# Patient Record
Sex: MALE | Race: WHITE | NOT HISPANIC OR LATINO | Employment: OTHER
[De-identification: names, ages, dates, MRNs, and addresses within clinical notes are randomized per-mention and may not be internally consistent; named-entity substitution may affect disease eponyms.]

---

## 2020-03-22 ENCOUNTER — HEALTH MAINTENANCE LETTER (OUTPATIENT)
Age: 75
End: 2020-03-22

## 2021-01-15 ENCOUNTER — HEALTH MAINTENANCE LETTER (OUTPATIENT)
Age: 76
End: 2021-01-15

## 2021-05-09 ENCOUNTER — HEALTH MAINTENANCE LETTER (OUTPATIENT)
Age: 76
End: 2021-05-09

## 2021-10-24 ENCOUNTER — HEALTH MAINTENANCE LETTER (OUTPATIENT)
Age: 76
End: 2021-10-24

## 2022-06-05 ENCOUNTER — HEALTH MAINTENANCE LETTER (OUTPATIENT)
Age: 77
End: 2022-06-05

## 2022-09-26 PROCEDURE — 88304 TISSUE EXAM BY PATHOLOGIST: CPT | Mod: TC,ORL | Performed by: OPHTHALMOLOGY

## 2022-09-27 ENCOUNTER — LAB REQUISITION (OUTPATIENT)
Dept: LAB | Facility: CLINIC | Age: 77
End: 2022-09-27
Payer: MEDICARE

## 2022-09-27 DIAGNOSIS — C69.01 MALIGNANT NEOPLASM OF RIGHT CONJUNCTIVA (H): ICD-10-CM

## 2022-09-28 LAB
PATH REPORT.COMMENTS IMP SPEC: NORMAL
PATH REPORT.FINAL DX SPEC: NORMAL
PATH REPORT.GROSS SPEC: NORMAL
PATH REPORT.MICROSCOPIC SPEC OTHER STN: NORMAL
PATH REPORT.MICROSCOPIC SPEC OTHER STN: NORMAL
PATH REPORT.RELEVANT HX SPEC: NORMAL
PHOTO IMAGE: NORMAL

## 2022-09-28 PROCEDURE — 88304 TISSUE EXAM BY PATHOLOGIST: CPT | Mod: 26 | Performed by: PATHOLOGY

## 2022-10-16 ENCOUNTER — HEALTH MAINTENANCE LETTER (OUTPATIENT)
Age: 77
End: 2022-10-16

## 2023-04-15 ENCOUNTER — HOSPITAL ENCOUNTER (EMERGENCY)
Facility: CLINIC | Age: 78
Discharge: HOME | End: 2023-04-15
Attending: EMERGENCY MEDICINE | Admitting: EMERGENCY MEDICINE
Payer: MEDICARE

## 2023-04-15 VITALS
RESPIRATION RATE: 22 BRPM | DIASTOLIC BLOOD PRESSURE: 94 MMHG | HEIGHT: 69 IN | BODY MASS INDEX: 28.88 KG/M2 | SYSTOLIC BLOOD PRESSURE: 155 MMHG | OXYGEN SATURATION: 98 % | HEART RATE: 117 BPM | TEMPERATURE: 97 F | WEIGHT: 195 LBS

## 2023-04-15 DIAGNOSIS — R33.9 URINARY RETENTION: ICD-10-CM

## 2023-04-15 LAB
ALBUMIN UR-MCNC: NEGATIVE MG/DL
AMORPH CRY #/AREA URNS HPF: ABNORMAL /HPF
APPEARANCE UR: CLEAR
BILIRUB UR QL STRIP: NEGATIVE
COLOR UR AUTO: ABNORMAL
GLUCOSE UR STRIP-MCNC: NEGATIVE MG/DL
HGB UR QL STRIP: NEGATIVE
KETONES UR STRIP-MCNC: NEGATIVE MG/DL
LEUKOCYTE ESTERASE UR QL STRIP: NEGATIVE
NITRATE UR QL: NEGATIVE
PH UR STRIP: 7 [PH] (ref 5–7)
RBC URINE: 1 /HPF
SP GR UR STRIP: 1.01 (ref 1–1.03)
UROBILINOGEN UR STRIP-MCNC: NORMAL MG/DL
WBC URINE: 1 /HPF

## 2023-04-15 PROCEDURE — 51702 INSERT TEMP BLADDER CATH: CPT

## 2023-04-15 PROCEDURE — 51798 US URINE CAPACITY MEASURE: CPT

## 2023-04-15 PROCEDURE — 81001 URINALYSIS AUTO W/SCOPE: CPT | Performed by: EMERGENCY MEDICINE

## 2023-04-15 PROCEDURE — 99284 EMERGENCY DEPT VISIT MOD MDM: CPT | Mod: 25

## 2023-04-15 RX ORDER — TAMSULOSIN HYDROCHLORIDE 0.4 MG/1
0.4 CAPSULE ORAL DAILY
Qty: 7 CAPSULE | Refills: 0 | Status: SHIPPED | OUTPATIENT
Start: 2023-04-15 | End: 2023-04-22

## 2023-04-15 ASSESSMENT — ACTIVITIES OF DAILY LIVING (ADL): ADLS_ACUITY_SCORE: 35

## 2023-04-16 ENCOUNTER — HOSPITAL ENCOUNTER (EMERGENCY)
Facility: CLINIC | Age: 78
Discharge: HOME | End: 2023-04-16
Attending: EMERGENCY MEDICINE | Admitting: EMERGENCY MEDICINE
Payer: MEDICARE

## 2023-04-16 ENCOUNTER — APPOINTMENT (OUTPATIENT)
Dept: CT IMAGING | Facility: CLINIC | Age: 78
End: 2023-04-16
Attending: EMERGENCY MEDICINE
Payer: MEDICARE

## 2023-04-16 VITALS
TEMPERATURE: 97.7 F | SYSTOLIC BLOOD PRESSURE: 126 MMHG | BODY MASS INDEX: 28.8 KG/M2 | DIASTOLIC BLOOD PRESSURE: 70 MMHG | RESPIRATION RATE: 20 BRPM | WEIGHT: 195 LBS | OXYGEN SATURATION: 97 % | HEART RATE: 68 BPM

## 2023-04-16 DIAGNOSIS — R31.9 URINARY TRACT INFECTION WITH HEMATURIA, SITE UNSPECIFIED: ICD-10-CM

## 2023-04-16 DIAGNOSIS — E86.0 DEHYDRATION: ICD-10-CM

## 2023-04-16 DIAGNOSIS — N48.89 PENILE PAIN: ICD-10-CM

## 2023-04-16 DIAGNOSIS — N39.0 URINARY TRACT INFECTION WITH HEMATURIA, SITE UNSPECIFIED: ICD-10-CM

## 2023-04-16 LAB
ALBUMIN UR-MCNC: 200 MG/DL
ALBUMIN UR-MCNC: ABNORMAL G/DL
ANION GAP SERPL CALCULATED.3IONS-SCNC: 12 MMOL/L (ref 7–15)
APPEARANCE UR: ABNORMAL
APPEARANCE UR: ABNORMAL
BACTERIA #/AREA URNS HPF: ABNORMAL /[HPF]
BASOPHILS # BLD AUTO: 0.1 10E3/UL (ref 0–0.2)
BASOPHILS NFR BLD AUTO: 1 %
BILIRUB UR QL STRIP: ABNORMAL
BILIRUB UR QL STRIP: NEGATIVE
BUN SERPL-MCNC: 13.2 MG/DL (ref 8–23)
CALCIUM SERPL-MCNC: 10.1 MG/DL (ref 8.8–10.2)
CHLORIDE SERPL-SCNC: 107 MMOL/L (ref 98–107)
COLOR UR AUTO: ABNORMAL
COLOR UR AUTO: ABNORMAL
CREAT SERPL-MCNC: 0.79 MG/DL (ref 0.67–1.17)
DEPRECATED HCO3 PLAS-SCNC: 21 MMOL/L (ref 22–29)
EOSINOPHIL # BLD AUTO: 0.2 10E3/UL (ref 0–0.7)
EOSINOPHIL NFR BLD AUTO: 2 %
ERYTHROCYTE [DISTWIDTH] IN BLOOD BY AUTOMATED COUNT: 13 % (ref 10–15)
GFR SERPL CREATININE-BSD FRML MDRD: >90 ML/MIN/1.73M2
GLUCOSE SERPL-MCNC: 132 MG/DL (ref 70–99)
GLUCOSE UR STRIP-MCNC: ABNORMAL MG/DL
GLUCOSE UR STRIP-MCNC: NEGATIVE MG/DL
HCT VFR BLD AUTO: 40.8 % (ref 40–53)
HGB BLD-MCNC: 13.7 G/DL (ref 13.3–17.7)
HGB UR QL STRIP: ABNORMAL
HGB UR QL STRIP: ABNORMAL
IMM GRANULOCYTES # BLD: 0 10E3/UL
IMM GRANULOCYTES NFR BLD: 0 %
KETONES UR STRIP-MCNC: ABNORMAL MG/DL
KETONES UR STRIP-MCNC: NEGATIVE MG/DL
LEUKOCYTE ESTERASE UR QL STRIP: ABNORMAL
LEUKOCYTE ESTERASE UR QL STRIP: ABNORMAL
LYMPHOCYTES # BLD AUTO: 1.2 10E3/UL (ref 0.8–5.3)
LYMPHOCYTES NFR BLD AUTO: 11 %
MCH RBC QN AUTO: 32.5 PG (ref 26.5–33)
MCHC RBC AUTO-ENTMCNC: 33.6 G/DL (ref 31.5–36.5)
MCV RBC AUTO: 97 FL (ref 78–100)
MONOCYTES # BLD AUTO: 0.7 10E3/UL (ref 0–1.3)
MONOCYTES NFR BLD AUTO: 7 %
MUCOUS THREADS #/AREA URNS LPF: ABNORMAL /[LPF]
MUCOUS THREADS #/AREA URNS LPF: PRESENT /LPF
NEUTROPHILS # BLD AUTO: 8.4 10E3/UL (ref 1.6–8.3)
NEUTROPHILS NFR BLD AUTO: 79 %
NITRATE UR QL: ABNORMAL
NITRATE UR QL: NEGATIVE
NRBC # BLD AUTO: 0 10E3/UL
NRBC BLD AUTO-RTO: 0 /100
PH UR STRIP: 5.5 [PH] (ref 5–7)
PH UR STRIP: ABNORMAL [PH]
PLATELET # BLD AUTO: 272 10E3/UL (ref 150–450)
POTASSIUM SERPL-SCNC: 3.6 MMOL/L (ref 3.4–5.3)
RBC # BLD AUTO: 4.22 10E6/UL (ref 4.4–5.9)
RBC URINE: >182 /HPF
RBC URINE: ABNORMAL
SODIUM SERPL-SCNC: 140 MMOL/L (ref 136–145)
SP GR UR STRIP: 1.03 (ref 1–1.03)
SP GR UR STRIP: ABNORMAL
UROBILINOGEN UR STRIP-MCNC: ABNORMAL MG/DL
UROBILINOGEN UR STRIP-MCNC: NORMAL MG/DL
WBC # BLD AUTO: 10.7 10E3/UL (ref 4–11)
WBC URINE: 100 /HPF
WBC URINE: ABNORMAL

## 2023-04-16 PROCEDURE — G1010 CDSM STANSON: HCPCS

## 2023-04-16 PROCEDURE — 36415 COLL VENOUS BLD VENIPUNCTURE: CPT | Performed by: EMERGENCY MEDICINE

## 2023-04-16 PROCEDURE — 96374 THER/PROPH/DIAG INJ IV PUSH: CPT

## 2023-04-16 PROCEDURE — 81003 URINALYSIS AUTO W/O SCOPE: CPT | Performed by: EMERGENCY MEDICINE

## 2023-04-16 PROCEDURE — 99285 EMERGENCY DEPT VISIT HI MDM: CPT | Mod: 25

## 2023-04-16 PROCEDURE — 80048 BASIC METABOLIC PNL TOTAL CA: CPT | Performed by: EMERGENCY MEDICINE

## 2023-04-16 PROCEDURE — 250N000011 HC RX IP 250 OP 636: Performed by: EMERGENCY MEDICINE

## 2023-04-16 PROCEDURE — 85025 COMPLETE CBC W/AUTO DIFF WBC: CPT | Performed by: EMERGENCY MEDICINE

## 2023-04-16 PROCEDURE — 258N000003 HC RX IP 258 OP 636: Performed by: EMERGENCY MEDICINE

## 2023-04-16 PROCEDURE — 87086 URINE CULTURE/COLONY COUNT: CPT | Performed by: EMERGENCY MEDICINE

## 2023-04-16 PROCEDURE — 96361 HYDRATE IV INFUSION ADD-ON: CPT

## 2023-04-16 RX ORDER — DIAZEPAM 5 MG
5 TABLET ORAL EVERY 6 HOURS PRN
Qty: 10 TABLET | Refills: 0 | Status: SHIPPED | OUTPATIENT
Start: 2023-04-16 | End: 2023-04-21

## 2023-04-16 RX ORDER — CEPHALEXIN 500 MG/1
500 CAPSULE ORAL 3 TIMES DAILY
Qty: 21 CAPSULE | Refills: 0 | Status: SHIPPED | OUTPATIENT
Start: 2023-04-16 | End: 2023-04-23

## 2023-04-16 RX ORDER — DIAZEPAM 10 MG/2ML
5 INJECTION, SOLUTION INTRAMUSCULAR; INTRAVENOUS ONCE
Status: COMPLETED | OUTPATIENT
Start: 2023-04-16 | End: 2023-04-16

## 2023-04-16 RX ADMIN — DIAZEPAM 5 MG: 5 INJECTION INTRAMUSCULAR; INTRAVENOUS at 13:48

## 2023-04-16 RX ADMIN — SODIUM CHLORIDE 1000 ML: 9 INJECTION, SOLUTION INTRAVENOUS at 16:13

## 2023-04-16 ASSESSMENT — ENCOUNTER SYMPTOMS
ABDOMINAL PAIN: 0
HEMATURIA: 1
DIFFICULTY URINATING: 1
FLANK PAIN: 0

## 2023-04-16 ASSESSMENT — ACTIVITIES OF DAILY LIVING (ADL)
ADLS_ACUITY_SCORE: 35
ADLS_ACUITY_SCORE: 35

## 2023-04-16 NOTE — ED TRIAGE NOTES
Pt here yesterday, had aponte cath placed, has been having come bloody urine, cherry colored, having severe pain in penis, pt states urine has been draining    Triage Assessment     Row Name 04/16/23 1236       Triage Assessment (Adult)    Airway WDL WDL       Respiratory WDL    Respiratory WDL WDL       Cardiac WDL    Cardiac WDL WDL       Cognitive/Neuro/Behavioral WDL    Cognitive/Neuro/Behavioral WDL WDL

## 2023-04-16 NOTE — DISCHARGE INSTRUCTIONS
As we discussed, no obvious cause of your pain was found here today, and the CT scan does show incidental kidney stones in your left side, that are not likely to be symptomatic.  He also did have some blood in your urine, this is potentially related to the catheter insertion from yesterday, which certainly would have caused some bleeding as it rubbed up against your enlarged prostate.  Regarding your pain, not entirely sure what is coming from, it may be a combination of the discomfort you are feeling from the Gruber catheter itself, and possibly even spasms given the on and off nature of your symptoms.  You also may be having some pain from a possible infection and since your urine does have changes since yesterday, we have decided to treat for a urine infection as well.  Please be certain that you see urology in the next 2 to 3 days as well in person.  Come back with any other concerns or questions or if you feel like you have any allergic reaction to the medications we have given you here.

## 2023-04-16 NOTE — ED PROVIDER NOTES
History     Chief Complaint:  Hematuria     HPI   Chaz Dye is a 77 year old male with a history of diffuse large B-cell lymphoma and BPH who presents with hematuria and severe penile pain ongoing since onset earlier this morning. The patient has enlarged prostate, which hadn't caused any issues until yesterday when he was newly unable to urinate. He was seen here in ED at which time a catheter was placed and the patient was discharged home with Flomax (took yesterday and today). Last night the patient was able to get some sleep until he awoke at 0700 this morning with severe penile pain and new hematuria. Since then, his pain has persisted in intermittent boughts, prompting his concern and representation to ED at this time. Presently, patient notes that he feels as if he can't urinate and states that when he presses on his abdomen, his bladder feels full. He denies any abdominal or flank pain. No history of hematuria.    Independent Historian:   None - Patient Only    Review of External Notes: Yes, I have reviewed past visits for lymphoma     ROS:  Review of Systems   Gastrointestinal: Negative for abdominal pain.   Genitourinary: Positive for difficulty urinating, hematuria and penile pain. Negative for flank pain.   All other systems reviewed and are negative.     Allergies:  Bactrim [Sulfamethoxazole W/Trimethoprim]  Cefepime  Ciprofloxacin  Imipenem  Cisplatin  Morphine  Vancomycin     Medications:    Aspirin 325 mg  Carvedilol  Cetirizine  Fluticasone  Latanoprost  Pantoprazole sodium  Rosuvastatin calcium  Tamsulosin  Valsartan  Cholecalciferol  Clobetasol  Chlorthalidone  Esomeprazole magnesium  Dimethicone    Past Medical History:    Diffuse large B-cell lymphoma  Hypertension  Hyperlipidemia  Hyperparathyroidism  Insomnia  Melanoma  Multiple sclerosis  Malignant melanoma of skin  Seborrheic keratosis  Nevus pigmentation  Osteopenia  Hypertriglyceridemia  Erectile dysfunction  Reflux  esophagitis  Schatzki's ring  Colonic polyps  Thrombocytopenia  Non-Hodgkin's lymphoma  Agranulocytosis  Syncope  Obesity  Tinnitus  Burkitt's tumor  GERD  Parathyroid adenoma  BPH  Hiatal hernia  Neutropenia  Prediabetes  Reactive depression  Acute renal failure  Stem cell transplant  Vitamin D deficiency    Past Surgical History:    Appendectomy  Colonoscopy  Hernia repair  Rotator cuff repair  Meniscectomy  Parathyroidectomy  Phacoemulsification bilateral  Right wrist cyst removal  Surgery of the small intestine  EGD, combined  Shoulder surgery, right, x2    Family History:   COPD  CHF  Alcoholism    Social History:  The patient presented alone.  The patient arrived in a private vehicle.  PCP: Missael Keith     Physical Exam     Patient Vitals for the past 24 hrs:   BP Temp Pulse Resp SpO2 Weight   04/16/23 1238 126/70 -- -- -- -- --   04/16/23 1235 -- 97.7  F (36.5  C) 68 20 97 % 88.5 kg (195 lb)      Physical Exam  Vitals: reviewed by me  General: Pt seen on [facility], pleasant, cooperative, and alert to conversation.  Very anxious however also appears to be in significant pain, jumping up and down off the bed occasionally with pain  Eyes: Tracking well, clear conjunctiva BL  ENT: MMM, midline trachea.   Lungs: No tachypnea, no accessory muscle use. No respiratory distress.   CV: Rate as above  Abd: Soft, non tender, no guarding, no rebound. Non distended  Gruber catheter appears to be in place, intermittent but not repeatable tenderness noted to the inferior aspect of his penis along the track of the Gruber catheter.  Urethra with no inflammation or drainage, no evidence of trauma, I do not feel the balloon in the urethra.  MSK: no joint effusion.  No evidence of trauma  Skin: No rash  Neuro: Clear speech and no facial droop.  Psych: Not RIS, no e/o AH/VH      Emergency Department Course     Imaging:  CT Abdomen Pelvis w/o Contrast   Final Result   IMPRESSION:    1.  Prominent prostatic enlargement.   2.   Cholelithiasis.   3.  There are two nonobstructing stones in the lower pole of the right kidney, with the largest measuring 0.6 cm.   4.  Indeterminate cystic lesion in the pancreatic tail measures 1.6 cm. A follow-up pancreatic MRI with MRCP in one year would be recommended.            Report per radiology    Laboratory:  Labs Ordered and Resulted from Time of ED Arrival to Time of ED Departure   ROUTINE UA WITH MICROSCOPIC REFLEX TO CULTURE - Abnormal       Result Value    Color Urine Brown (*)     Appearance Urine Slightly Cloudy (*)     Glucose Urine        Bilirubin Urine        Ketones Urine        Specific Gravity Urine        Blood Urine        pH Urine        Protein Albumin Urine        Urobilinogen Urine        Nitrite Urine        Leukocyte Esterase Urine        Bacteria Urine        Mucus Urine        RBC Urine        WBC Urine       BASIC METABOLIC PANEL - Abnormal    Sodium 140      Potassium 3.6      Chloride 107      Carbon Dioxide (CO2) 21 (*)     Anion Gap 12      Urea Nitrogen 13.2      Creatinine 0.79      Calcium 10.1      Glucose 132 (*)     GFR Estimate >90     CBC WITH PLATELETS AND DIFFERENTIAL - Abnormal    WBC Count 10.7      RBC Count 4.22 (*)     Hemoglobin 13.7      Hematocrit 40.8      MCV 97      MCH 32.5      MCHC 33.6      RDW 13.0      Platelet Count 272      % Neutrophils 79      % Lymphocytes 11      % Monocytes 7      % Eosinophils 2      % Basophils 1      % Immature Granulocytes 0      NRBCs per 100 WBC 0      Absolute Neutrophils 8.4 (*)     Absolute Lymphocytes 1.2      Absolute Monocytes 0.7      Absolute Eosinophils 0.2      Absolute Basophils 0.1      Absolute Immature Granulocytes 0.0      Absolute NRBCs 0.0     ROUTINE UA WITH MICROSCOPIC REFLEX TO CULTURE - Abnormal    Color Urine Orange (*)     Appearance Urine Slightly Cloudy (*)     Glucose Urine Negative      Bilirubin Urine Negative      Ketones Urine Negative      Specific Gravity Urine 1.030      Blood Urine  Large (*)     pH Urine 5.5      Protein Albumin Urine 200 (*)     Urobilinogen Urine Normal      Nitrite Urine Negative      Leukocyte Esterase Urine Moderate (*)     Mucus Urine Present (*)     RBC Urine >182 (*)     WBC Urine 100 (*)    URINE CULTURE      Emergency Department Course & Assessments:     Interventions:  Medications   diazepam (VALIUM) injection 5 mg (5 mg Intravenous $Given 4/16/23 1348)   0.9% sodium chloride BOLUS (1,000 mLs Intravenous $New Bag 4/16/23 1613)      Assessments:  1326 I obtained history and examined the patient as noted above.  1550 I rechecked the patient and explained findings.  1608 I rechecked the patient. He's not entirely sure if any of the medication on his allergy list are true allergies. Risks and benefits of keflex were explained. He's on board and would like to try Keflex.    Independent Interpretation (X-rays, CTs, rhythm strip):  None    Social Determinants of Health affecting care:   None    Disposition:  The patient was discharged to home.     Impression & Plan      Medical Decision Making:  This is a pleasant 77-year-old male who presents to the emergency room with what appears to be worsening pain directly inside of his penis after the Gruber catheter was put in yesterday, unclear cause.  He also has some hematuria but I suspect that is likely from the Gruber insertion yesterday.  He has no abdominal pain, no fever, and his pain certainly seems to come in waves, and almost has a spasmodic in nature.  I did give him a dose of Valium here to help with any muscle spasms or urethral or bladder spasms he was having, and thankfully this seems to have helped quite a bit.  Another reason he may be having some pain is that there may be some degree of an infection in place, I realized that the glucosuria may be due to the hematuria from insertion, though with the amount of pain he seems to be in, a discussed the case with him and we agreed to treat for this possibility as well.   Therefore he will go home with Valium and antibiotics, Keflex specifically.  He is allergic to multiple antibiotics, and he tells me that he does not remember ever being told he was allergic to any of them, and denies any actual life-threatening anaphylactic presentations.  He does not remember what happens if he gets penicillin and is open to trying Keflex here.  He is draining his urine well, I do not think that it is prudent to take out the catheter since he has significant BPH and has only been on Flomax for 1 to 2 days.  I do think he can follow safely in the outpatient setting with the urology team, and the patient's okay with this plan as well.  Very reliable appearing historian, I do think that he will come back if anything gets worse.  No fevers here, CT scan unremarkable, okay for discharge as above.    Diagnosis:    ICD-10-CM    1. Penile pain  N48.89       2. Urinary tract infection with hematuria, site unspecified  N39.0     R31.9       3. Dehydration  E86.0          Discharge Medications:  New Prescriptions    CEPHALEXIN (KEFLEX) 500 MG CAPSULE    Take 1 capsule (500 mg) by mouth 3 times daily for 7 days    DIAZEPAM (VALIUM) 5 MG TABLET    Take 1 tablet (5 mg) by mouth every 6 hours as needed for anxiety, sleep or pain (spasms)      Scribe Disclosure:  I, Candice Yair, am serving as a scribe at 1:21 PM on 4/16/2023 to document services personally performed by Chaz Mesa MD based on my observations and the provider's statements to me.     4/16/2023   Chaz Mesa MD Fitzgerald, Michael Maxwell Kreofsky, MD  04/16/23 5257

## 2023-04-18 LAB — BACTERIA UR CULT: NO GROWTH

## 2023-11-04 ENCOUNTER — HEALTH MAINTENANCE LETTER (OUTPATIENT)
Age: 78
End: 2023-11-04

## 2024-08-04 ENCOUNTER — APPOINTMENT (OUTPATIENT)
Dept: CT IMAGING | Facility: CLINIC | Age: 79
End: 2024-08-04
Attending: EMERGENCY MEDICINE
Payer: COMMERCIAL

## 2024-08-04 ENCOUNTER — HOSPITAL ENCOUNTER (EMERGENCY)
Facility: CLINIC | Age: 79
Discharge: HOME | End: 2024-08-04
Attending: EMERGENCY MEDICINE | Admitting: EMERGENCY MEDICINE
Payer: COMMERCIAL

## 2024-08-04 VITALS
HEART RATE: 86 BPM | SYSTOLIC BLOOD PRESSURE: 159 MMHG | RESPIRATION RATE: 18 BRPM | OXYGEN SATURATION: 97 % | TEMPERATURE: 97.4 F | DIASTOLIC BLOOD PRESSURE: 86 MMHG

## 2024-08-04 DIAGNOSIS — T59.811A SMOKE INHALATION: ICD-10-CM

## 2024-08-04 DIAGNOSIS — Z78.9 ALCOHOL USE: ICD-10-CM

## 2024-08-04 LAB
ANION GAP SERPL CALCULATED.3IONS-SCNC: 10 MMOL/L (ref 7–15)
BASOPHILS # BLD AUTO: 0.1 10E3/UL (ref 0–0.2)
BASOPHILS NFR BLD AUTO: 2 %
BUN SERPL-MCNC: 10.6 MG/DL (ref 8–23)
CALCIUM SERPL-MCNC: 9.5 MG/DL (ref 8.8–10.4)
CHLORIDE SERPL-SCNC: 106 MMOL/L (ref 98–107)
COHGB MFR BLD: 1.6 % (ref 0–2)
CREAT SERPL-MCNC: 0.69 MG/DL (ref 0.67–1.17)
EGFRCR SERPLBLD CKD-EPI 2021: >90 ML/MIN/1.73M2
EOSINOPHIL # BLD AUTO: 0.3 10E3/UL (ref 0–0.7)
EOSINOPHIL NFR BLD AUTO: 5 %
ERYTHROCYTE [DISTWIDTH] IN BLOOD BY AUTOMATED COUNT: 18 % (ref 10–15)
ETHANOL SERPL-MCNC: 0.09 G/DL
GLUCOSE SERPL-MCNC: 99 MG/DL (ref 70–99)
HCO3 SERPL-SCNC: 25 MMOL/L (ref 22–29)
HCT VFR BLD AUTO: 34.7 % (ref 40–53)
HGB BLD-MCNC: 11.2 G/DL (ref 13.3–17.7)
IMM GRANULOCYTES # BLD: 0 10E3/UL
IMM GRANULOCYTES NFR BLD: 0 %
LYMPHOCYTES # BLD AUTO: 1.1 10E3/UL (ref 0.8–5.3)
LYMPHOCYTES NFR BLD AUTO: 21 %
MCH RBC QN AUTO: 29 PG (ref 26.5–33)
MCHC RBC AUTO-ENTMCNC: 32.3 G/DL (ref 31.5–36.5)
MCV RBC AUTO: 90 FL (ref 78–100)
MONOCYTES # BLD AUTO: 0.6 10E3/UL (ref 0–1.3)
MONOCYTES NFR BLD AUTO: 11 %
NEUTROPHILS # BLD AUTO: 3.2 10E3/UL (ref 1.6–8.3)
NEUTROPHILS NFR BLD AUTO: 61 %
NRBC # BLD AUTO: 0 10E3/UL
NRBC BLD AUTO-RTO: 0 /100
PLATELET # BLD AUTO: 242 10E3/UL (ref 150–450)
POTASSIUM SERPL-SCNC: 3.3 MMOL/L (ref 3.4–5.3)
RBC # BLD AUTO: 3.86 10E6/UL (ref 4.4–5.9)
SODIUM SERPL-SCNC: 141 MMOL/L (ref 135–145)
WBC # BLD AUTO: 5.2 10E3/UL (ref 4–11)

## 2024-08-04 PROCEDURE — 36415 COLL VENOUS BLD VENIPUNCTURE: CPT | Performed by: EMERGENCY MEDICINE

## 2024-08-04 PROCEDURE — 99284 EMERGENCY DEPT VISIT MOD MDM: CPT | Mod: 25

## 2024-08-04 PROCEDURE — 82077 ASSAY SPEC XCP UR&BREATH IA: CPT | Performed by: EMERGENCY MEDICINE

## 2024-08-04 PROCEDURE — 82375 ASSAY CARBOXYHB QUANT: CPT | Performed by: EMERGENCY MEDICINE

## 2024-08-04 PROCEDURE — 80048 BASIC METABOLIC PNL TOTAL CA: CPT | Performed by: EMERGENCY MEDICINE

## 2024-08-04 PROCEDURE — 70450 CT HEAD/BRAIN W/O DYE: CPT

## 2024-08-04 PROCEDURE — 85025 COMPLETE CBC W/AUTO DIFF WBC: CPT | Performed by: EMERGENCY MEDICINE

## 2024-08-04 ASSESSMENT — COLUMBIA-SUICIDE SEVERITY RATING SCALE - C-SSRS
2. HAVE YOU ACTUALLY HAD ANY THOUGHTS OF KILLING YOURSELF IN THE PAST MONTH?: NO
6. HAVE YOU EVER DONE ANYTHING, STARTED TO DO ANYTHING, OR PREPARED TO DO ANYTHING TO END YOUR LIFE?: NO
1. IN THE PAST MONTH, HAVE YOU WISHED YOU WERE DEAD OR WISHED YOU COULD GO TO SLEEP AND NOT WAKE UP?: NO
2. HAVE YOU ACTUALLY HAD ANY THOUGHTS OF KILLING YOURSELF IN THE PAST MONTH?: NO
6. HAVE YOU EVER DONE ANYTHING, STARTED TO DO ANYTHING, OR PREPARED TO DO ANYTHING TO END YOUR LIFE?: NO
1. IN THE PAST MONTH, HAVE YOU WISHED YOU WERE DEAD OR WISHED YOU COULD GO TO SLEEP AND NOT WAKE UP?: NO

## 2024-08-04 ASSESSMENT — ACTIVITIES OF DAILY LIVING (ADL)
ADLS_ACUITY_SCORE: 35
ADLS_ACUITY_SCORE: 35

## 2024-08-05 NOTE — ED NOTES
Bed: ED16  Expected date:   Expected time:   Means of arrival:   Comments:  Machelle 1 78M confusion after fall, eta 1952
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
## 2024-09-06 ENCOUNTER — HOSPITAL ENCOUNTER (EMERGENCY)
Facility: CLINIC | Age: 79
Discharge: LEFT BEFORE DISCHARGE | End: 2024-09-06
Attending: EMERGENCY MEDICINE | Admitting: EMERGENCY MEDICINE
Payer: COMMERCIAL

## 2024-09-06 VITALS
SYSTOLIC BLOOD PRESSURE: 145 MMHG | TEMPERATURE: 98 F | WEIGHT: 182 LBS | HEART RATE: 100 BPM | RESPIRATION RATE: 20 BRPM | OXYGEN SATURATION: 99 % | BODY MASS INDEX: 26.88 KG/M2 | DIASTOLIC BLOOD PRESSURE: 89 MMHG

## 2024-09-06 PROCEDURE — 99281 EMR DPT VST MAYX REQ PHY/QHP: CPT

## 2024-09-06 ASSESSMENT — ACTIVITIES OF DAILY LIVING (ADL)
ADLS_ACUITY_SCORE: 33
ADLS_ACUITY_SCORE: 35

## 2024-09-06 ASSESSMENT — COLUMBIA-SUICIDE SEVERITY RATING SCALE - C-SSRS
1. IN THE PAST MONTH, HAVE YOU WISHED YOU WERE DEAD OR WISHED YOU COULD GO TO SLEEP AND NOT WAKE UP?: NO
2. HAVE YOU ACTUALLY HAD ANY THOUGHTS OF KILLING YOURSELF IN THE PAST MONTH?: NO
6. HAVE YOU EVER DONE ANYTHING, STARTED TO DO ANYTHING, OR PREPARED TO DO ANYTHING TO END YOUR LIFE?: NO

## 2024-09-06 NOTE — ED TRIAGE NOTES
Pt states he has severe constipation to the point he is unable to pull out all the vivian of stools.      Triage Assessment (Adult)       Row Name 09/06/24 1758          Triage Assessment    Airway WDL WDL        Respiratory WDL    Respiratory WDL WDL        Skin Circulation/Temperature WDL    Skin Circulation/Temperature WDL WDL        Cardiac WDL    Cardiac WDL WDL        Peripheral/Neurovascular WDL    Peripheral Neurovascular WDL WDL        Cognitive/Neuro/Behavioral WDL    Cognitive/Neuro/Behavioral WDL WDL

## 2024-09-07 NOTE — ED PROVIDER NOTES
"I signed up in [facility] to see this patient when he was electronically signed to room 11.  However, soon thereafter, before I ever met the patient, he was changed electronically to show \"PLACIDO\".  Therefore, I have not seen the patient or examined or evaluated him in any way, and will not be documenting anything further until if and when he reappears here in the emergency department.    MD Arabella Murray Jeffrey Alan, MD  09/06/24 2112    "

## 2024-10-19 ENCOUNTER — HEALTH MAINTENANCE LETTER (OUTPATIENT)
Age: 79
End: 2024-10-19

## 2025-04-11 ENCOUNTER — HOSPITAL ENCOUNTER (EMERGENCY)
Facility: CLINIC | Age: 80
Discharge: HOME | End: 2025-04-12
Attending: EMERGENCY MEDICINE | Admitting: EMERGENCY MEDICINE
Payer: COMMERCIAL

## 2025-04-11 DIAGNOSIS — N45.1 EPIDIDYMITIS: ICD-10-CM

## 2025-04-11 DIAGNOSIS — R33.8 ACUTE URINARY RETENTION: ICD-10-CM

## 2025-04-11 LAB
ALBUMIN SERPL BCG-MCNC: 4 G/DL (ref 3.5–5.2)
ALBUMIN UR-MCNC: NEGATIVE MG/DL
ALP SERPL-CCNC: 108 U/L (ref 40–150)
ALT SERPL W P-5'-P-CCNC: 14 U/L (ref 0–70)
ANION GAP SERPL CALCULATED.3IONS-SCNC: 14 MMOL/L (ref 7–15)
APPEARANCE UR: CLEAR
AST SERPL W P-5'-P-CCNC: 19 U/L (ref 0–45)
BASOPHILS # BLD AUTO: 0.1 10E3/UL (ref 0–0.2)
BASOPHILS NFR BLD AUTO: 3 %
BILIRUB SERPL-MCNC: 0.4 MG/DL
BILIRUB UR QL STRIP: NEGATIVE
BUN SERPL-MCNC: 10.9 MG/DL (ref 8–23)
CALCIUM SERPL-MCNC: 9.4 MG/DL (ref 8.8–10.4)
CHLORIDE SERPL-SCNC: 107 MMOL/L (ref 98–107)
COLOR UR AUTO: ABNORMAL
CREAT SERPL-MCNC: 0.78 MG/DL (ref 0.67–1.17)
EGFRCR SERPLBLD CKD-EPI 2021: >90 ML/MIN/1.73M2
EOSINOPHIL # BLD AUTO: 0.2 10E3/UL (ref 0–0.7)
EOSINOPHIL NFR BLD AUTO: 4 %
ERYTHROCYTE [DISTWIDTH] IN BLOOD BY AUTOMATED COUNT: 16.1 % (ref 10–15)
GLUCOSE SERPL-MCNC: 107 MG/DL (ref 70–99)
GLUCOSE UR STRIP-MCNC: NEGATIVE MG/DL
HCO3 SERPL-SCNC: 18 MMOL/L (ref 22–29)
HCT VFR BLD AUTO: 36.3 % (ref 40–53)
HGB BLD-MCNC: 12 G/DL (ref 13.3–17.7)
HGB UR QL STRIP: ABNORMAL
IMM GRANULOCYTES # BLD: 0 10E3/UL
IMM GRANULOCYTES NFR BLD: 0 %
KETONES UR STRIP-MCNC: NEGATIVE MG/DL
LEUKOCYTE ESTERASE UR QL STRIP: ABNORMAL
LYMPHOCYTES # BLD AUTO: 1.2 10E3/UL (ref 0.8–5.3)
LYMPHOCYTES NFR BLD AUTO: 21 %
MCH RBC QN AUTO: 28.5 PG (ref 26.5–33)
MCHC RBC AUTO-ENTMCNC: 33.1 G/DL (ref 31.5–36.5)
MCV RBC AUTO: 86 FL (ref 78–100)
MONOCYTES # BLD AUTO: 0.6 10E3/UL (ref 0–1.3)
MONOCYTES NFR BLD AUTO: 10 %
NEUTROPHILS # BLD AUTO: 3.5 10E3/UL (ref 1.6–8.3)
NEUTROPHILS NFR BLD AUTO: 63 %
NITRATE UR QL: NEGATIVE
NRBC # BLD AUTO: 0 10E3/UL
NRBC BLD AUTO-RTO: 0 /100
PH UR STRIP: 6 [PH] (ref 5–7)
PLATELET # BLD AUTO: 258 10E3/UL (ref 150–450)
POTASSIUM SERPL-SCNC: 3.3 MMOL/L (ref 3.4–5.3)
PROT SERPL-MCNC: 7.2 G/DL (ref 6.4–8.3)
RBC # BLD AUTO: 4.21 10E6/UL (ref 4.4–5.9)
RBC URINE: 1 /HPF
SODIUM SERPL-SCNC: 139 MMOL/L (ref 135–145)
SP GR UR STRIP: 1 (ref 1–1.03)
UROBILINOGEN UR STRIP-MCNC: NORMAL MG/DL
WBC # BLD AUTO: 5.6 10E3/UL (ref 4–11)
WBC URINE: 11 /HPF

## 2025-04-11 PROCEDURE — 51798 US URINE CAPACITY MEASURE: CPT | Performed by: EMERGENCY MEDICINE

## 2025-04-11 PROCEDURE — 80053 COMPREHEN METABOLIC PANEL: CPT | Performed by: EMERGENCY MEDICINE

## 2025-04-11 PROCEDURE — 85004 AUTOMATED DIFF WBC COUNT: CPT | Performed by: EMERGENCY MEDICINE

## 2025-04-11 PROCEDURE — 51702 INSERT TEMP BLADDER CATH: CPT | Performed by: EMERGENCY MEDICINE

## 2025-04-11 PROCEDURE — 99284 EMERGENCY DEPT VISIT MOD MDM: CPT | Mod: 25 | Performed by: EMERGENCY MEDICINE

## 2025-04-11 PROCEDURE — 36415 COLL VENOUS BLD VENIPUNCTURE: CPT | Performed by: EMERGENCY MEDICINE

## 2025-04-11 PROCEDURE — 85041 AUTOMATED RBC COUNT: CPT | Performed by: EMERGENCY MEDICINE

## 2025-04-11 PROCEDURE — 81001 URINALYSIS AUTO W/SCOPE: CPT | Performed by: EMERGENCY MEDICINE

## 2025-04-11 PROCEDURE — 87086 URINE CULTURE/COLONY COUNT: CPT | Performed by: EMERGENCY MEDICINE

## 2025-04-11 RX ORDER — CEPHALEXIN 500 MG/1
500 CAPSULE ORAL ONCE
Status: COMPLETED | OUTPATIENT
Start: 2025-04-11 | End: 2025-04-12

## 2025-04-11 ASSESSMENT — ACTIVITIES OF DAILY LIVING (ADL)
ADLS_ACUITY_SCORE: 41
ADLS_ACUITY_SCORE: 41

## 2025-04-11 ASSESSMENT — COLUMBIA-SUICIDE SEVERITY RATING SCALE - C-SSRS
6. HAVE YOU EVER DONE ANYTHING, STARTED TO DO ANYTHING, OR PREPARED TO DO ANYTHING TO END YOUR LIFE?: NO
1. IN THE PAST MONTH, HAVE YOU WISHED YOU WERE DEAD OR WISHED YOU COULD GO TO SLEEP AND NOT WAKE UP?: NO
2. HAVE YOU ACTUALLY HAD ANY THOUGHTS OF KILLING YOURSELF IN THE PAST MONTH?: NO

## 2025-04-12 ENCOUNTER — APPOINTMENT (OUTPATIENT)
Dept: ULTRASOUND IMAGING | Facility: CLINIC | Age: 80
End: 2025-04-12
Attending: EMERGENCY MEDICINE
Payer: COMMERCIAL

## 2025-04-12 VITALS
DIASTOLIC BLOOD PRESSURE: 111 MMHG | SYSTOLIC BLOOD PRESSURE: 148 MMHG | TEMPERATURE: 97 F | BODY MASS INDEX: 26.66 KG/M2 | RESPIRATION RATE: 18 BRPM | WEIGHT: 180 LBS | HEIGHT: 69 IN | HEART RATE: 67 BPM | OXYGEN SATURATION: 96 %

## 2025-04-12 PROCEDURE — 250N000013 HC RX MED GY IP 250 OP 250 PS 637: Performed by: EMERGENCY MEDICINE

## 2025-04-12 PROCEDURE — 93976 VASCULAR STUDY: CPT

## 2025-04-12 RX ORDER — DOXYCYCLINE 100 MG/1
100 CAPSULE ORAL 2 TIMES DAILY
Qty: 20 CAPSULE | Refills: 0 | Status: SHIPPED | OUTPATIENT
Start: 2025-04-12 | End: 2025-04-22

## 2025-04-12 RX ADMIN — CEPHALEXIN 500 MG: 500 CAPSULE ORAL at 01:09

## 2025-04-12 ASSESSMENT — ACTIVITIES OF DAILY LIVING (ADL): ADLS_ACUITY_SCORE: 41

## 2025-04-12 NOTE — ED NOTES
Pt's aponte bag replaced with leg bag with tech assistance.  Pt's discharge instructions given by writer.  Pt verbalized understanding of all instructions.

## 2025-04-12 NOTE — ED TRIAGE NOTES
Pt has not been able to urinate for the past two hours. Pt c/o severe lower abdominal pain.      Triage Assessment (Adult)       Row Name 04/11/25 1984          Triage Assessment    Airway WDL WDL        Respiratory WDL    Respiratory WDL WDL        Cardiac WDL    Cardiac WDL WDL        Peripheral/Neurovascular WDL    Peripheral Neurovascular WDL WDL        Cognitive/Neuro/Behavioral WDL    Cognitive/Neuro/Behavioral WDL WDL

## 2025-04-12 NOTE — DISCHARGE INSTRUCTIONS
*You may resume diet and activities as tolerated.  Wear leg bag and aponte catheter as instructed.  *Take medications as prescribed.  Doxycycline as prescribed.   *Follow up with urology in the next 5-7 days for further evaluation and aponte removal if appropriate.  *Return if you develop fever, back pain, vomiting, have decreased or no urine output, faint or feel like you will faint or become worse in any way.

## 2025-04-12 NOTE — ED PROVIDER NOTES
History     Chief Complaint:  Urinary Retention       HPI   Chaz Dye is a 79 year old male who comes in extreme discomfort with suprapubic pain and inability to urinate.  He was able to go a little bit about 1/2-hour prior to arrival but the pain became so severe that he had to present to the emergency department.  He denies any urinary symptoms.  No fever.  After placement of the Gruber catheter he reports complete relief of his pain.  He states that he does have a urologist on [address] but he has never had any problems like this before.  No new medications.  No new over-the-counter medications.  He does not think he should any of any infections and he reports that he does not have time to play around or do anything that would put him at risk for infection.    Independent Historian:    None    Review of External Notes:  Reviewed previous medication prescriptions.  Patient received cephalexin in 2023.  Reviewed outpatient urine culture results in Care Everywhere.  Reviewed neurology visit note from April 10, 2025.  Per care everywhere patient sees Dr. Castaneda for urology.  There is report of high PSA with 3 negative biopsies.  Also history of epididymitis and orchitis but dates are unclear.    Medications:    aspirin  MG tablet  Carvedilol (COREG PO)  cetirizine (ZYRTEC) 10 MG tablet  doxycycline hyclate (VIBRAMYCIN) 100 MG capsule  fluticasone (FLOVENT DISKUS) 50 MCG/BLIST AEPB  latanoprost (XALATAN) 0.005 % ophthalmic solution  Pantoprazole Sodium (PROTONIX PO)  Rosuvastatin Calcium (CRESTOR PO)  Valsartan (DIOVAN PO)  VITAMIN D, CHOLECALCIFEROL, PO        Past Medical History:    Past Medical History:   Diagnosis Date    Diffuse large b-cell lymphoma (H)     Hyperlipidemia     Hyperparathyroidism     Hypertension     Insomnia     Melanoma (H)     MS (multiple sclerosis) (H)     Osteopenia     Reflux esophagitis     Schatzki's ring     Tinnitus     Vitamin D deficiency        Past  "Surgical History:    Past Surgical History:   Procedure Laterality Date    APPENDECTOMY      COLONOSCOPY      HERNIA REPAIR      ORTHOPEDIC SURGERY      rotator cuff repair, meniscectomy    PARATHYROIDECTOMY      PHACOEMULSIFICATION CLEAR CORNEA WITH STANDARD INTRAOCULAR LENS IMPLANT Right 8/22/2016    Procedure: PHACOEMULSIFICATION CLEAR CORNEA WITH STANDARD INTRAOCULAR LENS IMPLANT;  Surgeon: Edgardo Vegas MD;  Location: [facility]    PHACOEMULSIFICATION CLEAR CORNEA WITH STANDARD INTRAOCULAR LENS IMPLANT Left 9/6/2016    Procedure: PHACOEMULSIFICATION CLEAR CORNEA WITH STANDARD INTRAOCULAR LENS IMPLANT;  Surgeon: Edgardo Vegas MD;  Location: [facility]    SOFT TISSUE SURGERY      right wrist cyst removal          Physical Exam   Patient Vitals for the past 24 hrs:   BP Temp Temp src Pulse Resp SpO2 Height Weight   04/12/25 0000 (!) 148/111 -- -- 67 18 96 % -- --   04/11/25 2230 (!) 144/81 -- -- 75 -- 94 % -- --   04/11/25 2209 (!) 130/103 -- -- 80 18 98 % -- --   04/11/25 2140 (!) 174/85 97  F (36.1  C) -- (!) 129 -- 98 % 1.753 m (5' 9\") 81.6 kg (180 lb)   04/11/25 2138 -- -- Temporal -- 18 -- -- --        Physical Exam  General: Well-nourished  Eyes: PERRL, conjunctivae pink no scleral icterus or conjunctival injection  ENT:  Moist mucus membranes, posterior oropharynx clear without erythema or exudates  Respiratory:  Lungs clear to auscultation bilaterally, no crackles/rubs/wheezes.  Good air movement  CV: Normal rate and rhythm, no murmurs/rubs/gallops  GI:  Abdomen soft and non-distended.  Normoactive BS.  No tenderness, guarding or rebound  : Gruber draining clear yellow urine.  No blood at the meatus.  Mild right sided testicular tenderness.  Normal lie.  No overlying crepitus or cellulitis.  Skin: Warm, dry.  No rashes or petechiae  Musculoskeletal: No peripheral edema or calf tenderness  Neuro: Alert and oriented to person/place/time  Psychiatric: Normal affect    Emergency Department Course     Imaging:  US " Testicular & Scrotum w Doppler Ltd   Preliminary Result   IMPRESSION:   1.  Right epididymitis.   2.  Bilateral hydroceles.          Laboratory:  Labs Ordered and Resulted from Time of ED Arrival to Time of ED Departure   ROUTINE UA WITH MICROSCOPIC REFLEX TO CULTURE - Abnormal       Result Value    Color Urine Straw      Appearance Urine Clear      Glucose Urine Negative      Bilirubin Urine Negative      Ketones Urine Negative      Specific Gravity Urine 1.002 (*)     Blood Urine Small (*)     pH Urine 6.0      Protein Albumin Urine Negative      Urobilinogen Urine Normal      Nitrite Urine Negative      Leukocyte Esterase Urine Moderate (*)     RBC Urine 1      WBC Urine 11 (*)    COMPREHENSIVE METABOLIC PANEL - Abnormal    Sodium 139      Potassium 3.3 (*)     Carbon Dioxide (CO2) 18 (*)     Anion Gap 14      Urea Nitrogen 10.9      Creatinine 0.78      GFR Estimate >90      Calcium 9.4      Chloride 107      Glucose 107 (*)     Alkaline Phosphatase 108      AST 19      ALT 14      Protein Total 7.2      Albumin 4.0      Bilirubin Total 0.4     CBC WITH PLATELETS AND DIFFERENTIAL - Abnormal    WBC Count 5.6      RBC Count 4.21 (*)     Hemoglobin 12.0 (*)     Hematocrit 36.3 (*)     MCV 86      MCH 28.5      MCHC 33.1      RDW 16.1 (*)     Platelet Count 258      % Neutrophils 63      % Lymphocytes 21      % Monocytes 10      % Eosinophils 4      % Basophils 3      % Immature Granulocytes 0      NRBCs per 100 WBC 0      Absolute Neutrophils 3.5      Absolute Lymphocytes 1.2      Absolute Monocytes 0.6      Absolute Eosinophils 0.2      Absolute Basophils 0.1      Absolute Immature Granulocytes 0.0      Absolute NRBCs 0.0     URINE CULTURE        Emergency Department Course & Assessments:    Interventions:  Medications   cephALEXin (KEFLEX) capsule 500 mg (500 mg Oral $Given 4/12/25 0102)        Independent Interpretation (X-rays, CTs, rhythm strip):  None    Consultations/Discussion of Management or Tests:    ED  Course as of 04/12/25 0149   Fri Apr 11, 2025   2348 Days patient on the results.  Discussed possible urinary tract infection.  Patient now is reminded that he had already made an appointment to see his urologist today because he was having left testicular swelling.  He states he forgot to make the connection earlier.  Will obtain an ultrasound.  He also reports he does not think he has any allergies.  He thinks he tolerated the cephalexin he was prescribed in the past without difficulty.   I updated the patient.  Discussed the ultrasound findings and the plan for antibiotics as an outpatient.  Discussed his allergies again.  Discussed that he needs to follow-up with urology and will provide the phone number for him.    Social Drivers of Health affecting care:  None     Disposition:  The patient was discharged.    Impression & Plan       Medical Decision Making:  Chaz Dye is a 79 year old male who presents for evaluation of abdominal pain and decreased urinary output.  I considered a broad differential including diverticulitis, aneurysm, urinary retention, ureterolithiasis, UTI, pyelonephritis, neurologic causes (MS, cauda equina,etc), colitis, etc.  The history and exam are consistent with acute urinary retention and this is confirmed after aponte catheter placement.  A urinalysis was obtained and was not definitive but could be consistent with urinary tract infection.  Additionally he also reported subsequently that he was having some right-sided scrotal swelling and discomfort when he pressed on his right testicle.  An ultrasound is consistent with epididymitis on the right.  Given his drug allergies we will treat with doxycycline.  He had already received a dose of cephalexin here in the emergency department.  No signs of systemic illness or sepsis.  Will send home with catheter  and have patient followup with urology in 3-5 days.  Medications for discharge noted above.  Patient is stable for discharge  home.      Diagnosis:    ICD-10-CM    1. Acute urinary retention  R33.8       2. Epididymitis  N45.1            Discharge Medications:  Discharge Medication List as of 4/12/2025  1:18 AM        START taking these medications    Details   doxycycline hyclate (VIBRAMYCIN) 100 MG capsule Take 1 capsule (100 mg) by mouth 2 times daily for 10 days., Disp-20 capsule, R-0, Local Print              4/12/2025   Tianna Brooks MD Cho, Amy C, MD  04/12/25 0151       Tianna Brooks MD  04/12/25 0151

## 2025-04-13 LAB — BACTERIA UR CULT: NO GROWTH

## 2025-05-07 ENCOUNTER — HOSPITAL ENCOUNTER (EMERGENCY)
Facility: CLINIC | Age: 80
Discharge: HOME | End: 2025-05-07
Attending: EMERGENCY MEDICINE | Admitting: EMERGENCY MEDICINE
Payer: COMMERCIAL

## 2025-05-07 VITALS
DIASTOLIC BLOOD PRESSURE: 89 MMHG | HEART RATE: 70 BPM | HEIGHT: 68 IN | WEIGHT: 180 LBS | TEMPERATURE: 97.9 F | BODY MASS INDEX: 27.28 KG/M2 | SYSTOLIC BLOOD PRESSURE: 140 MMHG | OXYGEN SATURATION: 100 % | RESPIRATION RATE: 18 BRPM

## 2025-05-07 DIAGNOSIS — S01.81XA LACERATION OF BROW WITHOUT COMPLICATION, INITIAL ENCOUNTER: ICD-10-CM
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
## 2025-05-07 DIAGNOSIS — T14.8XXA SKIN ABRASION: ICD-10-CM

## 2025-05-07 LAB
ALBUMIN SERPL BCG-MCNC: 3.5 G/DL (ref 3.5–5.2)
ALP SERPL-CCNC: 117 U/L (ref 40–150)
ALT SERPL W P-5'-P-CCNC: 20 U/L (ref 0–70)
ANION GAP SERPL CALCULATED.3IONS-SCNC: 14 MMOL/L (ref 7–15)
AST SERPL W P-5'-P-CCNC: 21 U/L (ref 0–45)
BASOPHILS # BLD AUTO: 0.1 10E3/UL (ref 0–0.2)
BASOPHILS NFR BLD AUTO: 2 %
BILIRUB SERPL-MCNC: 0.4 MG/DL
BUN SERPL-MCNC: 24.2 MG/DL (ref 8–23)
CALCIUM SERPL-MCNC: 9.7 MG/DL (ref 8.8–10.4)
CHLORIDE SERPL-SCNC: 100 MMOL/L (ref 98–107)
CREAT SERPL-MCNC: 0.97 MG/DL (ref 0.67–1.17)
EGFRCR SERPLBLD CKD-EPI 2021: 79 ML/MIN/1.73M2
EOSINOPHIL # BLD AUTO: 0.2 10E3/UL (ref 0–0.7)
EOSINOPHIL NFR BLD AUTO: 3 %
ERYTHROCYTE [DISTWIDTH] IN BLOOD BY AUTOMATED COUNT: 15.8 % (ref 10–15)
ETHANOL SERPL-MCNC: 0.07 G/DL
GLUCOSE SERPL-MCNC: 106 MG/DL (ref 70–99)
HCO3 SERPL-SCNC: 21 MMOL/L (ref 22–29)
HCT VFR BLD AUTO: 37.9 % (ref 40–53)
HGB BLD-MCNC: 12.3 G/DL (ref 13.3–17.7)
IMM GRANULOCYTES # BLD: 0 10E3/UL
IMM GRANULOCYTES NFR BLD: 1 %
LYMPHOCYTES # BLD AUTO: 1.6 10E3/UL (ref 0.8–5.3)
LYMPHOCYTES NFR BLD AUTO: 26 %
MAGNESIUM SERPL-MCNC: 1.9 MG/DL (ref 1.7–2.3)
MCH RBC QN AUTO: 28 PG (ref 26.5–33)
MCHC RBC AUTO-ENTMCNC: 32.5 G/DL (ref 31.5–36.5)
MCV RBC AUTO: 86 FL (ref 78–100)
MONOCYTES # BLD AUTO: 0.5 10E3/UL (ref 0–1.3)
MONOCYTES NFR BLD AUTO: 9 %
NEUTROPHILS # BLD AUTO: 3.6 10E3/UL (ref 1.6–8.3)
NEUTROPHILS NFR BLD AUTO: 60 %
NRBC # BLD AUTO: 0 10E3/UL
NRBC BLD AUTO-RTO: 0 /100
PLATELET # BLD AUTO: 394 10E3/UL (ref 150–450)
POTASSIUM SERPL-SCNC: 3.6 MMOL/L (ref 3.4–5.3)
PROT SERPL-MCNC: 6.9 G/DL (ref 6.4–8.3)
RBC # BLD AUTO: 4.39 10E6/UL (ref 4.4–5.9)
SODIUM SERPL-SCNC: 135 MMOL/L (ref 135–145)
WBC # BLD AUTO: 6 10E3/UL (ref 4–11)

## 2025-05-07 PROCEDURE — 90471 IMMUNIZATION ADMIN: CPT | Performed by: EMERGENCY MEDICINE

## 2025-05-07 PROCEDURE — 83735 ASSAY OF MAGNESIUM: CPT | Performed by: EMERGENCY MEDICINE

## 2025-05-07 PROCEDURE — 99284 EMERGENCY DEPT VISIT MOD MDM: CPT | Mod: 25

## 2025-05-07 PROCEDURE — 85004 AUTOMATED DIFF WBC COUNT: CPT | Performed by: EMERGENCY MEDICINE

## 2025-05-07 PROCEDURE — 90715 TDAP VACCINE 7 YRS/> IM: CPT | Performed by: EMERGENCY MEDICINE

## 2025-05-07 PROCEDURE — 82077 ASSAY SPEC XCP UR&BREATH IA: CPT | Performed by: EMERGENCY MEDICINE

## 2025-05-07 PROCEDURE — 80053 COMPREHEN METABOLIC PANEL: CPT | Performed by: EMERGENCY MEDICINE

## 2025-05-07 PROCEDURE — 250N000011 HC RX IP 250 OP 636: Performed by: EMERGENCY MEDICINE

## 2025-05-07 PROCEDURE — 36415 COLL VENOUS BLD VENIPUNCTURE: CPT | Performed by: EMERGENCY MEDICINE

## 2025-05-07 PROCEDURE — 12013 RPR F/E/E/N/L/M 2.6-5.0 CM: CPT

## 2025-05-07 RX ADMIN — CLOSTRIDIUM TETANI TOXOID ANTIGEN (FORMALDEHYDE INACTIVATED), CORYNEBACTERIUM DIPHTHERIAE TOXOID ANTIGEN (FORMALDEHYDE INACTIVATED), BORDETELLA PERTUSSIS TOXOID ANTIGEN (GLUTARALDEHYDE INACTIVATED), BORDETELLA PERTUSSIS FILAMENTOUS HEMAGGLUTININ ANTIGEN (FORMALDEHYDE INACTIVATED), BORDETELLA PERTUSSIS PERTACTIN ANTIGEN, AND BORDETELLA PERTUSSIS FIMBRIAE 2/3 ANTIGEN 0.5 ML: 5; 2; 2.5; 5; 3; 5 INJECTION, SUSPENSION INTRAMUSCULAR at 19:52

## 2025-05-07 ASSESSMENT — COLUMBIA-SUICIDE SEVERITY RATING SCALE - C-SSRS
2. HAVE YOU ACTUALLY HAD ANY THOUGHTS OF KILLING YOURSELF IN THE PAST MONTH?: NO
1. IN THE PAST MONTH, HAVE YOU WISHED YOU WERE DEAD OR WISHED YOU COULD GO TO SLEEP AND NOT WAKE UP?: NO
6. HAVE YOU EVER DONE ANYTHING, STARTED TO DO ANYTHING, OR PREPARED TO DO ANYTHING TO END YOUR LIFE?: NO

## 2025-05-07 ASSESSMENT — ACTIVITIES OF DAILY LIVING (ADL)
ADLS_ACUITY_SCORE: 41

## 2025-05-07 NOTE — ED TRIAGE NOTES
Patient sanjeev from [employer] parking lot where he was found by a bystander on the ground. There are several wounds over his body. Patient reports having 2 gin and tonics this evening.     Triage Assessment (Adult)       Row Name 05/07/25 1849          Triage Assessment    Airway WDL WDL        Respiratory WDL    Respiratory WDL WDL        Skin Circulation/Temperature WDL    Skin Circulation/Temperature WDL WDL        Cardiac WDL    Cardiac WDL WDL        Peripheral/Neurovascular WDL    Peripheral Neurovascular WDL WDL        Cognitive/Neuro/Behavioral WDL    Cognitive/Neuro/Behavioral WDL X;mood/behavior

## 2025-05-07 NOTE — ED NOTES
Bed: ED13  Expected date:   Expected time:   Means of arrival:   Comments:  Machelle 1 79 M fall facial inj unk thinners eta 1838

## 2025-05-08 NOTE — ED NOTES
Pt to D/C to home.  Pt provided with d/c instructions, including new medications, when medications were last given, and when to take them again.  Pt also informed to f/u with PCP to remove sutures.  Pt verbalized understanding of all d/c and f/u instructions.  All questions were answered at this time.  Copy of paperwork sent with pt.

## 2025-05-08 NOTE — ED PROVIDER NOTES
Emergency Department Note      History of Present Illness     Chief Complaint   Fall and Alcohol Intoxication    HPI   Chaz Dye is a 79 year old male with a history of MS, hypertension, hyperlipidemia, diffuse large B-cell lymphoma, hypertension, and hyperlipidemia who presents via EMS following a fall. He was reported to be found at the liquor store after he fell. He reports he drank 2 gin and tonics tonight. He fell and hit his head and has a laceration to his left eyebrow and abrasion to his left knee. He denies left knee pain, hip pain, abdominal pain, neck, or chest wall pain. He blew a 0.05 for EMS.     Independent Historian   EMS as detailed above.    Review of External Notes   Per [facility], last tetanus was in 2015.     Past Medical History     Medical History and Problem List   Adenomatous colon polyp   Anxiety   Asymmetric SNHL (sensorineural hearing loss)   Burkitt's tumor or lymphoma, unspecified site, extranodal and solid organ sites   Chronic GERD   Diffuse large B-cell lymphoma  Depression, major, in remission   Hyperlipidemia  Hypertension  Hyperparathyroidism   History of stem cell transplant   History of ITP (idiopathic thrombocytopenic purpura)   History of acute renal failure   Hypertriglyceridemia   Insomnia  Lung nodules   Melanoma  MS  Non-Hodgkin's lymphoma in relapse   Osteopenia  Other neutropenia   Obstructive sleep apnea of adult   Prediabetes   Reflux esophagitis  Rotator cuff arthropathy of left shoulder   Schatzki's ring  Tinnitus  Vitamin D deficiency     Medications   Aspirin 325 MG  Amlodipine   Carvedilol   Fluticasone   Pantoprazole Sodium   Rosuvastatin Calcium   Tamsulosin  Valsartan   Vitamin D     Surgical History   Appendectomy  Hernia repair  Rotator cuff repair  Meniscectomy  Parathyroidectomy   Phacoemulsification clear cornea with standard intraocular lens implant, right, left  Soft tissue surgery   Shoulder surgery, left, right X2    Physical Exam     Patient Vitals  "for the past 24 hrs:   BP Temp Temp src Pulse Resp SpO2 Height Weight   05/07/25 1908 -- -- -- -- -- 100 % -- --   05/07/25 1848 (!) 140/89 97.9  F (36.6  C) Oral 70 18 -- 1.727 m (5' 8\") 81.6 kg (180 lb)     Physical Exam  Nursing note and vitals reviewed.    Constitutional:  Appears comfortable.  In a c-collar.  HENT:                No blood from the nose.  Abrasion over the left cheek and over the chin.  He has a small superficial laceration to his lower lip.  Teeth appear intact and jaw has normal range of motion.  He has a 3 cm laceration over the left eyebrow.  No periorbital tenderness.  Eyes:    Conjunctivae are normal without injection.  Pupils are equal.  Cardiovascular:  Normal rate, regular rhythm with normal S1 and S2.      Normal heart sounds and peripheral pulses 2+ and equal.       No murmur or lyn.  Pulmonary:  Effort normal and breath sounds clear to auscultation bilaterally.  No chest wall tenderness.  GI:    Soft. No distension and no mass. No tenderness.   Musculoskeletal:  Normal range of motion of all 4 extremities.  He is got abrasions to his left knee and some on his hands but no deformity and no swelling.  No cervical tenderness.  Neurological:   Alert and oriented. No focal weakness.  Speech is clear.  Full extraocular movements.  Sensation intact throughout.  Skin:    Skin is warm and dry.  Abrasions to the hands left knee and face.  Psychiatric:   Behavior is normal. Appropriate mood and affect.     Judgment and thought content normal.     Diagnostics     Lab Results   Labs Ordered and Resulted from Time of ED Arrival to Time of ED Departure   COMPREHENSIVE METABOLIC PANEL - Abnormal       Result Value    Sodium 135      Potassium 3.6      Carbon Dioxide (CO2) 21 (*)     Anion Gap 14      Urea Nitrogen 24.2 (*)     Creatinine 0.97      GFR Estimate 79      Calcium 9.7      Chloride 100      Glucose 106 (*)     Alkaline Phosphatase 117      AST 21      ALT 20      Protein Total 6.9   "    Albumin 3.5      Bilirubin Total 0.4     ETHYL ALCOHOL LEVEL - Abnormal    Alcohol ethyl 0.07 (*)    CBC WITH PLATELETS AND DIFFERENTIAL - Abnormal    WBC Count 6.0      RBC Count 4.39 (*)     Hemoglobin 12.3 (*)     Hematocrit 37.9 (*)     MCV 86      MCH 28.0      MCHC 32.5      RDW 15.8 (*)     Platelet Count 394      % Neutrophils 60      % Lymphocytes 26      % Monocytes 9      % Eosinophils 3      % Basophils 2      % Immature Granulocytes 1      NRBCs per 100 WBC 0      Absolute Neutrophils 3.6      Absolute Lymphocytes 1.6      Absolute Monocytes 0.5      Absolute Eosinophils 0.2      Absolute Basophils 0.1      Absolute Immature Granulocytes 0.0      Absolute NRBCs 0.0     MAGNESIUM - Normal    Magnesium 1.9       Imaging   Head CT w/o contrast   Final Result   IMPRESSION:   1.  No evidence of acute intracranial abnormality.   2.  Cerebral volume loss and presumed changes of chronic microvascular disease.         CT Soft Tissue Neck w/o Contrast   Final Result   IMPRESSION:    1.  No traumatic findings in the neck.   2.  Few mildly prominent lymph nodes of the superior mediastinum and right supraclavicular region.        Independent Interpretation   CT Head: No intracranial hemorrhage.    ED Course      Medications Administered   Medications   lido-EPINEPHrine-tetracaine (LET) topical gel GEL (has no administration in time range)   Tdap (tetanus-diphtheria-acell pertussis) (ADACEL) injection 0.5 mL (0.5 mLs Intramuscular $Given 5/7/25 1952)     Procedures   Procedures     Laceration Repair      Procedure: Laceration Repair    Indication: Laceration    Consent: Verbal    Tetanus status reviewed, last tetanus 2015.    Location: Left eyebrow     Length: 3.0 cm    Preparation: Irrigation with Sterile Saline.    Anesthesia/Sedation: Topical -LET and Bupivacaine with Epinephrine - 0.5%      Treatment/Exploration: Wound explored, no foreign bodies found     Closure: The wound was closed with one layer.  Skin/superficial layer was closed with 5 x 5-0 Nylon using Interrupted sutures.     Patient Status: The patient tolerated the procedure well: Yes. There were no complications.     Discussion of Management   None    ED Course   ED Course as of 05/07/25 2056   Wed May 07, 2025   1914 I obtained the patient's history and examined as noted above.    2016 I rechecked the patient and explained findings. I numbed up his wound.      Additional Documentation  None    Medical Decision Making / Diagnosis     CMS Diagnoses: None    MIPS       None    [facility]   Chaz Dye is a 79 year old male who tripped in the parking lot and fell.  He was at a liquor store buying some liquor and only had 2 drinks tonight.  His blood alcohol is only 0.07 he does not seem intoxicated.  Patient is oriented GCS is 15.  CT of the head and neck is unremarkable for acute findings.  He has no neurosymptoms.  The wounds were cleaned, the one on his eyebrow was anesthetized and I did close it with nylon sutures.  He will need these out the end of next week.  I did not want him driving tonight so he is going to call a couple people to come and take him to his car and they will get his car home and make sure he gets home safely.  Patient's tetanus was updated.  He will be stiff but wound care instructions were given.  If he were to develop any signs of a head injury over the next few days such as headache nausea and vomiting he needs to return to the ER.    I do not want you driving tonight, your car is likely at the [employer] store and have 2 people come to pick you up and they can go get your car and bring you home.  See your doctor at the end of next week for suture removal.  Ice to your face, keep your abrasions clean and follow-up with your doctor if there is any signs of infection.  Expect to be sore tomorrow.  Tylenol or ibuprofen as needed for pain.  You did receive your tetanus shot today.  If you develop a severe headache with nausea and vomiting,  call 911 and return to the ER.    Disposition   The patient was discharged.     Diagnosis     ICD-10-CM    1. Laceration of brow without complication, initial encounter  S01.81XA       2. Skin abrasion  T14.8XXA     Face, chin, and left knee      3. Ground-level fall  W18.30XA          Scribe Disclosure:  I, Tracy Wei, am serving as a scribe at 7:09 PM on 5/7/2025 to document services personally performed by Loli Queen MD based on my observations and the provider's statements to me.         Loli Queen MD  05/07/25 2056

## 2025-05-08 NOTE — DISCHARGE INSTRUCTIONS
I do not want you driving tonight, your car is likely at the [employer] store and have 2 people come to pick you up and they can go get your car and bring you home.  See your doctor at the end of next week for suture removal.  Ice to your face, keep your abrasions clean and follow-up with your doctor if there is any signs of infection.  Expect to be sore tomorrow.  Tylenol or ibuprofen as needed for pain.  You did receive your tetanus shot today.  If you develop a severe headache with nausea and vomiting, call 911 and return to the ER.